# Patient Record
Sex: MALE | HISPANIC OR LATINO | Employment: FULL TIME | URBAN - METROPOLITAN AREA
[De-identification: names, ages, dates, MRNs, and addresses within clinical notes are randomized per-mention and may not be internally consistent; named-entity substitution may affect disease eponyms.]

---

## 2018-03-12 ENCOUNTER — HOSPITAL ENCOUNTER (EMERGENCY)
Facility: CLINIC | Age: 53
Discharge: HOME | End: 2018-03-12
Attending: NURSE PRACTITIONER | Admitting: NURSE PRACTITIONER
Payer: COMMERCIAL

## 2018-03-12 VITALS
BODY MASS INDEX: 26.58 KG/M2 | WEIGHT: 150 LBS | HEIGHT: 63 IN | TEMPERATURE: 98 F | DIASTOLIC BLOOD PRESSURE: 94 MMHG | SYSTOLIC BLOOD PRESSURE: 169 MMHG | RESPIRATION RATE: 18 BRPM | OXYGEN SATURATION: 97 %

## 2018-03-12 DIAGNOSIS — B02.8 HERPES ZOSTER WITH COMPLICATION: ICD-10-CM

## 2018-03-12 PROCEDURE — 25000125 ZZHC RX 250: Performed by: NURSE PRACTITIONER

## 2018-03-12 PROCEDURE — 99283 EMERGENCY DEPT VISIT LOW MDM: CPT

## 2018-03-12 RX ORDER — LANCETS
EACH MISCELLANEOUS
COMMUNITY
Start: 2017-06-19

## 2018-03-12 RX ORDER — GABAPENTIN 400 MG/1
800 CAPSULE ORAL
COMMUNITY
Start: 2017-06-19

## 2018-03-12 RX ORDER — PEN NEEDLE, DIABETIC 30 GX5/16"
NEEDLE, DISPOSABLE MISCELLANEOUS
COMMUNITY
Start: 2017-06-19

## 2018-03-12 RX ORDER — GLIMEPIRIDE 4 MG/1
TABLET ORAL
COMMUNITY
Start: 2009-04-21

## 2018-03-12 RX ORDER — SERTRALINE HYDROCHLORIDE 100 MG/1
100 TABLET, FILM COATED ORAL
COMMUNITY
Start: 2017-06-19

## 2018-03-12 RX ORDER — LISINOPRIL 20 MG/1
20 TABLET ORAL
COMMUNITY
Start: 2017-06-19

## 2018-03-12 RX ORDER — ACETAMINOPHEN 500 MG
500 TABLET ORAL
COMMUNITY
Start: 2017-06-19

## 2018-03-12 RX ORDER — TRAMADOL HYDROCHLORIDE 50 MG/1
TABLET ORAL
COMMUNITY
Start: 2009-04-13

## 2018-03-12 RX ORDER — HYDROCODONE BITARTRATE AND ACETAMINOPHEN 5; 325 MG/1; MG/1
1-2 TABLET ORAL EVERY 4 HOURS PRN
Qty: 15 TABLET | Refills: 0 | Status: SHIPPED | OUTPATIENT
Start: 2018-03-12

## 2018-03-12 RX ORDER — PROPARACAINE HYDROCHLORIDE 5 MG/ML
1 SOLUTION/ DROPS OPHTHALMIC ONCE
Status: COMPLETED | OUTPATIENT
Start: 2018-03-12 | End: 2018-03-12

## 2018-03-12 RX ORDER — ASPIRIN 81 MG/1
TABLET ORAL
COMMUNITY
Start: 2008-05-21

## 2018-03-12 RX ORDER — OMEGA-3 FATTY ACIDS/FISH OIL 300-1000MG
2000 CAPSULE ORAL
COMMUNITY
Start: 2017-06-19

## 2018-03-12 RX ORDER — VALACYCLOVIR HYDROCHLORIDE 1 G/1
1000 TABLET, FILM COATED ORAL 3 TIMES DAILY
Qty: 21 TABLET | Refills: 0 | Status: SHIPPED | OUTPATIENT
Start: 2018-03-12

## 2018-03-12 RX ORDER — ISOPROPYL ALCOHOL 0.7 ML/1
SWAB TOPICAL
COMMUNITY
Start: 2017-06-19

## 2018-03-12 RX ORDER — ATORVASTATIN CALCIUM 40 MG/1
40 TABLET, FILM COATED ORAL
COMMUNITY
Start: 2017-06-19

## 2018-03-12 RX ADMIN — PROPARACAINE HYDROCHLORIDE 1 DROP: 5 SOLUTION/ DROPS OPHTHALMIC at 17:22

## 2018-03-12 ASSESSMENT — ENCOUNTER SYMPTOMS
EYE PAIN: 1
HEADACHES: 1
FEVER: 1

## 2018-03-12 NOTE — ED AVS SNAPSHOT
"  Emergency Department    [address] [facility] [address]    Phone:  [phone]    Fax:  [phone]                                       Lior Thompson   MRN: [id number]    Department:   Emergency Department   Date of Visit:  3/12/2018           Patient Information     Date Of Birth          1965        Your diagnoses for this visit were:     Herpes zoster with complication        You were seen by Bren Byers, CNP.      Follow-up Information     Follow up with [facility] In 1 day.    Contact information:    [address]  [address]  [facility] [address]  [phone]        Follow up with [facility] In 2 days.    Contact information:    [facility]    [address] [place] [address] [phone]         Follow up with  Emergency Department.    Specialty:  EMERGENCY MEDICINE    Why:  As needed, If symptoms worsen    Contact information:    [address] [facility] [address] [phone]        Discharge Instructions         La Infección Zoster [Herpes Zoster: Shingles]  El zoster (también llamado \"Zoster de Herpes\") es melissa infección muy contagiosa causada por el mismo virus que causa la Varicela. Por lo general ocurre en personas adultas mayores de 50 años o en los que tienen la inmunidad baja.  La infección comienza con hormigueo en melissa parte pequeña de la piel a un lado del cuerpo. Dm los primeros días aparecen pequeñas ampollas dolorosas en esta área. Sin embargo, a la diferencia de varicela, las ronchas no se pasan al teetee del cuerpo. El líquido dentro de las ampollas es contagioso para cualquier persona que nunca haya tenido varicela. El periodo contagioso termina cuando todas las ampollas ingrid melissa costra o cascara (en general se khalida 2 semanas después del comienzo de la enfermedad).  Pueden aparecer cicatrices en las partes en donde aparecieron las ampollas. A veces la parte de la piel todavía se siente sensible " (molesta) y con dolor por meses después de que se pasa la infección.  Cuidado En Casa:  1) Puede usar acetaminofén (Tylenol) o ibuprofeno (Motrin, Advil) para controlar el dolor, a menos que le receten otro medicamento. [ NOTA : Si sufre de enfermedad del hígado o rinones, o alguna vez tuvo melissa úlcera estomacal o sangrado gastrointestinal, hable con gaytan médico antes de usar estos medicamentos.]  2) Lacy melissa solución de agua fresca (no fría) con maicena, soda de bicarbonato, de brett (Aveeno) o de polvo [place] (el cual se compra sin receta médica). Ponga la solución a la parte afectada donna melissa compresa. [place] va a secar y aliviar la piel.  3) Lávese la piel con agua y jabón para mantener la erupción (salpullido) byron de infección.  4) Quédase en la casa hasta que las ampollas se hayan formado costra o cascaras y usted ya no soto contagioso.  5) Goessel la medicina recetada según las instrucciones hasta acabarla.  Seguimiento  con gaytan doctor o según las instrucciones de nuestro personal si los adriel ya mencionados no le dan alivio.  Busque Prontamente Atención Médica  si algo de lo siguiente ocurre:    Señales de infección en la piel: le sale líquido colorado de las llagas, se le aumenta el enrojecimiento o el dolor    Demasiado vómito, dolor de adina aaliyah o confusión    Tos y fiebre con dificultad al respirar o respiraciones rápidas    Dolor, hinchazón (inflamación) o enrojecimiento en las coyunturas    Llagas que se pasan hasta cerca de los párpados    Fiebre de 100.4 F (38 C) o más megan, o donna le haya indicado gaytan proveedor de atención médica  Date Last Reviewed: 9/25/2015 2000-2017 [employer]. [address] [place] [address]. Todos los derechos reservados. Esta información no pretende sustituir la atención médica profesional. Sólo gaytan médico puede diagnosticar y tratar un problema de jodi.          Discharge References/Attachments     HERPES EYE DISEASE, TREATMENT FOR (Citizen of [place])       24 Hour Appointment Hotline       To make an appointment at any [facility], call [phone] ([phone]). If you don't have a family doctor or clinic, we will help you find one. [facility] clinics are conveniently located to serve the needs of you and your family.             Review of your medicines      START taking        Dose / Directions Last dose taken    HYDROcodone-acetaminophen 5-325 MG per tablet   Commonly known as:  NORCO   Dose:  1-2 tablet   Quantity:  15 tablet        Take 1-2 tablets by mouth every 4 hours as needed for moderate to severe pain   Refills:  0        valACYclovir 1000 mg tablet   Commonly known as:  VALTREX   Dose:  1000 mg   Quantity:  21 tablet        Take 1 tablet (1,000 mg) by mouth 3 times daily   Refills:  0          Our records show that you are taking the medicines listed below. If these are incorrect, please call your family doctor or clinic.        Dose / Directions Last dose taken    acetaminophen 500 MG tablet   Commonly known as:  TYLENOL   Dose:  500 mg        Take 500 mg by mouth   Refills:  0        aspirin EC 81 MG EC tablet        Refills:  0        atorvastatin 40 MG tablet   Commonly known as:  LIPITOR   Dose:  40 mg        Take 40 mg by mouth   Refills:  0        NADIA CONTOUR NEXT test strip   Generic drug:  blood glucose monitoring        Test 2 times per day ** Use 2 veces al caprice   Refills:  0        cholecalciferol 1000 UNIT tablet   Commonly known as:  vitamin D3   Dose:  1000 Units        Take 1,000 Units by mouth   Refills:  0        CONTOUR NEXT EZ MONITOR W/DEVICE Kit        Use as directed ** Use uziel johnston .   Refills:  0        gabapentin 400 MG capsule   Commonly known as:  NEURONTIN   Dose:  800 mg        Take 800 mg by mouth   Refills:  0        glimepiride 4 MG tablet   Commonly known as:  AMARYL        Refills:  0        insulin aspart prot & aspart injection   Commonly known as:  NovoLOG MIX 70/30 PEN   Dose:  35 Units         "Inject 35 Units Subcutaneous   Refills:  0        lisinopril 20 MG tablet   Commonly known as:  PRINIVIL/ZESTRIL   Dose:  20 mg        Take 20 mg by mouth   Refills:  0        metFORMIN 1000 MG tablet   Commonly known as:  GLUCOPHAGE   Dose:  1000 mg        Take 1,000 mg by mouth   Refills:  0        omega 3 1000 MG Caps   Dose:  2000 mg        Take 2,000 mg by mouth   Refills:  0        pen needles 5/16\" 31G X 8 MM Misc        Use as directed twice daily** USE MARIBELL LO INDICADO DOS VECES AL SANTANA   Refills:  0        sertraline 100 MG tablet   Commonly known as:  ZOLOFT   Dose:  100 mg        Take 100 mg by mouth   Refills:  0        SM ALCOHOL PREP 70 % Pads        USE AS DIRECTED ** USE MARIBELL LO INDICADO   Refills:  0        traMADol 50 MG tablet   Commonly known as:  ULTRAM        Refills:  0        UNILET COMFORTOUCH LANCET Misc        Test 2 times per day ** Use 2 veces al santana   Refills:  0                Prescriptions were sent or printed at these locations (2 Prescriptions)                   Other Prescriptions                Printed at Department/Unit printer (2 of 2)         valACYclovir (VALTREX) 1000 mg tablet               HYDROcodone-acetaminophen (NORCO) 5-325 MG per tablet                Orders Needing Specimen Collection     None      Pending Results     No orders found from 3/10/2018 to 3/13/2018.            Pending Culture Results     No orders found from 3/10/2018 to 3/13/2018.            Pending Results Instructions     If you had any lab results that were not finalized at the time of your Discharge, you can call the ED Lab Result RN at [phone]. You will be contacted by this team for any positive Lab results or changes in treatment. The nurses are available 7 days a week from 10A to 6:30P.  You can leave a message 24 hours per day and they will return your call.        Test Results From Your Hospital Stay               Clinical Quality Measure: Blood Pressure Screening     Your blood pressure " "was checked while you were in the emergency department today. The last reading we obtained was  BP: (!) 169/94 . Please read the guidelines below about what these numbers mean and what you should do about them.  If your systolic blood pressure (the top number) is less than 120 and your diastolic blood pressure (the bottom number) is less than 80, then your blood pressure is normal. There is nothing more that you need to do about it.  If your systolic blood pressure (the top number) is 120-139 or your diastolic blood pressure (the bottom number) is 80-89, your blood pressure may be higher than it should be. You should have your blood pressure rechecked within a year by a primary care provider.  If your systolic blood pressure (the top number) is 140 or greater or your diastolic blood pressure (the bottom number) is 90 or greater, you may have high blood pressure. High blood pressure is treatable, but if left untreated over time it can put you at risk for heart attack, stroke, or kidney failure. You should have your blood pressure rechecked by a primary care provider within the next 4 weeks.  If your provider in the emergency department today gave you specific instructions to follow-up with your doctor or provider even sooner than that, you should follow that instruction and not wait for up to 4 weeks for your follow-up visit.        Thank you for choosing [facility]       Thank you for choosing [facility] for your care. Our goal is always to provide you with excellent care. Hearing back from our patients is one way we can continue to improve our services. Please take a few minutes to complete the written survey that you may receive in the mail after you visit with us. Thank you!        [facility] [employer] Information     [employer] lets you send messages to your doctor, view your test results, renew your prescriptions, schedule appointments and more. To sign up, go to [email or web address] . Click on \"Log in\" on the left side of the " "screen, which will take you to the Welcome page. Then click on \"Sign up Now\" on the right side of the page.     You will be asked to enter the access code listed below, as well as some personal information. Please follow the directions to create your username and password.     Your access code is: [email or web address]  Expires: 6/10/2018  7:00 PM     Your access code will  in 90 days. If you need help or a new code, please call your [facility] clinic or [phone].        Care EveryWhere ID     This is your Care EveryWhere ID. This could be used by other organizations to access your [facility] medical records  [id number]        Equal Access to Services     IRAIDA WRIGHT : Penny Blunt, jazz العلي, suzanne richards, joselito jiang. So [facility] [phone].    ATENCIÓN: Si habla español, tiene a gaytan disposición servicios gratuitos de asistencia lingüística. Llame al [phone].    We comply with applicable federal civil rights laws and [place] laws. We do not discriminate on the basis of race, color, national origin, age, disability, sex, sexual orientation, or gender identity.            After Visit Summary       This is your record. Keep this with you and show to your community pharmacist(s) and doctor(s) at your next visit.                  "

## 2018-03-12 NOTE — ED AVS SNAPSHOT
Emergency Department    [address] [facility] [address]    Phone:  [phone]    Fax:  [phone]                                       Lior Thompson   MRN: [id number]    Department:   Emergency Department   Date of Visit:  3/12/2018           After Visit Summary Signature Page     I have received my discharge instructions, and my questions have been answered. I have discussed any challenges I see with this plan with the nurse or doctor.    ..........................................................................................................................................  Patient/Patient Representative Signature      ..........................................................................................................................................  Patient Representative Print Name and Relationship to Patient    ..................................................               ................................................  Date                                            Time    ..........................................................................................................................................  Reviewed by Signature/Title    ...................................................              ..............................................  Date                                                            Time

## 2018-03-12 NOTE — ED PROVIDER NOTES
"  History     Chief Complaint:  Eye Pain     The history is limited by a language barrier. A  was used (Azeri).      Lior Thompson is a 52 year old male who presents with eye pain and facial rash. About 5 days, the patient developed a rash along the right cheek, left eyelid, nose, and upper lip. The patient went to the clinic today and was suspected to have shingles. He was told to come to ED for ophthalmologist. Initially the rash started as vesicles, but now present as scabs. The rash is itchy and painful, and sometimes feels numb. The patient also has had eye pain for the past 2 days after developing a vesicle on the lower lid, but doesn't have any visual disturbances. The patient denies fever and had an intermittent headache. He denies ear pain or ear rash. There are no rashes on any other part of his body. He has taken nothing for his symptoms. He wears glasses for driving but no contacts. The only medication he's taken is for his diabetes. He hasn't traveled recently.    Allergies:  NKDA     Medications:    Metformin  Novolog  Lisinopril  Amaryl  Asprin  Sertraline  Gabapentin     Past Medical History:    DM II    Past Surgical History:    The patient does not have any pertinent past surgical history  Family History:    No past pertinent family history.     Social History:  The patient denies tobacco or alcohol use.  Marital Status:   [2]     Review of Systems   Constitutional: Positive for fever.   Eyes: Positive for pain. Negative for visual disturbance.   Skin: Positive for rash.   Neurological: Positive for headaches.   Psychiatric/Behavioral: Negative for suicidal ideas.   All other systems reviewed and are negative.    Physical Exam   First Vitals:  BP: (!) 169/94  Heart Rate: 79  Temp: 98  F (36.7  C)  Resp: 16  Height: 160 cm (5' 3\")  Weight: 68 kg (150 lb)  SpO2: 97 %  Visual Acuity-Left:  (10 20)  Visual Acuity-Right:  (10 25)    Physical Exam  Nursing notes " reviewed. Vitals reviewed.  General: Alert. Well kept.  Ears: TM normal. No rash in ear canal.  Eyes:  Left eye conjunctiva non-injected, non-icteric. EOMI  Visual acuity: right eye - 20/25; left eye - 20/20.  Right eye: mild conjunctival injection on inferior aspect not extending to the limbus - no hemorrhage, or lacerations. No scleral icterus. No foreign body noted on upper eyelid eversion. On slit lamp exam - anterior chamber clear, no hyphema. no foreign body noted in cornea or conjunctiva. No corneal laceration or abrasion.  Normal Jacob sign. No corneal ulcer. No dendrites. No fluorescence uptake.  Proptosis-absent  Ptosis-absent  Anisocoria- absent  IOP 13  Neck/Throat: Moist mucous membranes. Normal voice.  Cardiac: Regular rhythm. Normal heart sounds.  Pulmonary: Clear and equal breath sounds bilaterally.   Musculoskeletal: Normal gross range of motion of all 4 extremities.   Neurological: Alert and oriented x4.  CNII-XII intact.   Skin: Warm and dry. Normal appearance of visualized exposed skin without rashes or petechiae.  Psych: Affect normal. Good eye contact.    Emergency Department Course     Interventions:  1722 Proparacaine 0.5% 1 drop, right eye    Emergency Department Course:  Nursing notes and vitals reviewed.     1657 I performed an exam of the patient as documented above.     Procedure performed as noted above.    1740 I rechecked the patient and discussed the results of his workup thus far.     1800 Page placed to [facility]    1824 Page placed to [facility]    1845 I consulted with Dr. Yang, [facility], regarding the patient's history and presentation here in the emergency department.    1850 I rechecked the patient and discussed the results of his workup thus far.     Findings and plan explained to the patient. Patient discharged home with instructions regarding supportive care, medications, and reasons to return. The importance of close follow-up was reviewed. The  patient was prescribed Valtrex and Norco.    I personally reviewed the laboratory results with the patient and answered all related questions prior to discharge.     Impression & Plan      Medical Decision Making:  Lior Thompson is a 52 year old male who presents for evaluation of painful rash on face.  The rash is in a dermatomal distribution over a single dermatome and appears clinically consistent with herpes zoster, concerning as in the V1 distribution of CN V.  Although the rash is scabbed with no new vesicles, will start valacyclovir for this due to V1 distribution and start pain medicine as noted below.  No signs at this point of disseminated zoster.  Patient is not immunocompromised and I see no signs of systemic illness that might have lead to this.  I will not get lab work to look for things like HIV or leukemia therefore.  See primary care doctor in follow up for recheck and refill of pain medicine if needed.   Needs urgent opthalmology referral as in V1 distribution; I do not see eye involvement at this time.  I spoke with Dr. Yang, [facility], who will see patient in clinic tomorrow.  He will return with fevers, vision loss, ear pain or ear rash, worsening  Headache or any new symptoms.     Diagnosis:    ICD-10-CM   1. Herpes zoster with complication B02.8     Disposition:  Discharged to home.    Discharge Medications:   Details   valacyclovir (VALTREX) 1000 mg tablet Take 1 tablet (1,000 mg) by mouth 3 times daily, Disp-21 tablet, R-0, Local Print      Hydrocodone-acetaminophen (NORCO) 5-325 MG per tablet Take 1-2 tablets by mouth every 4 hours as needed for moderate to severe pain, Disp-15 tablet, R-0, Local Print     I, Simon De La Garza, am serving as a scribe on 3/12/2018 at 4:57 PM to personally document services performed by Bren Byers CNP based on my observations and the provider's statements to me.     Simon De La Garza  3/12/2018    EMERGENCY DEPARTMENT       Bren Byers  CNP  03/12/18 [id number]

## 2018-03-12 NOTE — LETTER
March 12, 2018      To Whom It May Concern:      Lior Thompson was seen in our Emergency Department today, 03/12/18. Please excuse Lior from work on 3- due to illness.  He may return to work/school when improved.    Sincerely,        Bren Byers, CNP

## 2018-03-12 NOTE — DISCHARGE INSTRUCTIONS
"  La Infección Zoster [Herpes Zoster: Shingles]  El zoster (también llamado \"Zoster de Herpes\") es melissa infección muy contagiosa causada por el mismo virus que causa la Varicela. Por lo general ocurre en personas adultas mayores de 50 años o en los que tienen la inmunidad baja.  La infección comienza con hormigueo en melissa parte pequeña de la piel a un lado del cuerpo. Dm los primeros días aparecen pequeñas ampollas dolorosas en esta área. Sin embargo, a la diferencia de varicela, las ronchas no se pasan al teetee del cuerpo. El líquido dentro de las ampollas es contagioso para cualquier persona que nunca haya tenido varicela. El periodo contagioso termina cuando todas las ampollas ingrid melissa costra o cascara (en general se khalida 2 semanas después del comienzo de la enfermedad).  Pueden aparecer cicatrices en las partes en donde aparecieron las ampollas. A veces la parte de la piel todavía se siente sensible (molesta) y con dolor por meses después de que se pasa la infección.  Cuidado En Casa:  1) Puede usar acetaminofén (Tylenol) o ibuprofeno (Motrin, Advil) para controlar el dolor, a menos que le receten otro medicamento. [ NOTA : Si sufre de enfermedad del hígado o rinones, o alguna vez tuvo melissa úlcera estomacal o sangrado gastrointestinal, hable con gaytan médico antes de usar estos medicamentos.]  2) Lacy melissa solución de agua fresca (no fría) con maicena, soda de bicarbonato, de brett (Aveeno) o de polvo [place] (el cual se compra sin receta médica). Ponga la solución a la parte afectada donna melissa compresa. [place] va a secar y aliviar la piel.  3) Lávese la piel con agua y jabón para mantener la erupción (salpullido) byron de infección.  4) Quédase en la casa hasta que las ampollas se hayan formado costra o cascaras y usted ya no soto contagioso.  5) Sargeant la medicina recetada según las instrucciones hasta acabarla.  Seguimiento  con gaytan doctor o según las instrucciones de nuestro personal si los adriel ya " mencionados no le dan alivio.  Busque Prontamente Atención Médica  si algo de lo siguiente ocurre:    Señales de infección en la piel: le sale líquido colorado de las llagas, se le aumenta el enrojecimiento o el dolor    Demasiado vómito, dolor de adina aaliyah o confusión    Tos y fiebre con dificultad al respirar o respiraciones rápidas    Dolor, hinchazón (inflamación) o enrojecimiento en las coyunturas    Llagas que se pasan hasta cerca de los párpados    Fiebre de 100.4 F (38 C) o más megan, o donna le haya indicado gaytan proveedor de atención médica  Date Last Reviewed: 9/25/2015 2000-2017 [employer]. [address] [place] [address]. Todos los derechos reservados. Esta información no pretende sustituir la atención médica profesional. Sólo gaytan médico puede diagnosticar y tratar un problema de jodi.

## 2019-12-09 ENCOUNTER — APPOINTMENT (OUTPATIENT)
Dept: GENERAL RADIOLOGY | Facility: CLINIC | Age: 54
End: 2019-12-09
Attending: EMERGENCY MEDICINE
Payer: COMMERCIAL

## 2019-12-09 ENCOUNTER — HOSPITAL ENCOUNTER (EMERGENCY)
Facility: CLINIC | Age: 54
Discharge: HOME | End: 2019-12-09
Attending: EMERGENCY MEDICINE | Admitting: EMERGENCY MEDICINE
Payer: COMMERCIAL

## 2019-12-09 ENCOUNTER — APPOINTMENT (OUTPATIENT)
Dept: ULTRASOUND IMAGING | Facility: CLINIC | Age: 54
End: 2019-12-09
Attending: EMERGENCY MEDICINE
Payer: COMMERCIAL

## 2019-12-09 VITALS
SYSTOLIC BLOOD PRESSURE: 148 MMHG | HEART RATE: 74 BPM | OXYGEN SATURATION: 97 % | TEMPERATURE: 99.5 F | BODY MASS INDEX: 26.57 KG/M2 | WEIGHT: 150 LBS | RESPIRATION RATE: 16 BRPM | DIASTOLIC BLOOD PRESSURE: 76 MMHG

## 2019-12-09 DIAGNOSIS — R07.9 RIGHT-SIDED CHEST PAIN: ICD-10-CM

## 2019-12-09 DIAGNOSIS — R74.8 ELEVATED LIPASE: ICD-10-CM

## 2019-12-09 DIAGNOSIS — R73.9 HYPERGLYCEMIA: ICD-10-CM

## 2019-12-09 LAB
ALBUMIN SERPL-MCNC: 3.5 G/DL (ref 3.4–5)
ALP SERPL-CCNC: 105 U/L (ref 40–150)
ALT SERPL W P-5'-P-CCNC: 17 U/L (ref 0–70)
ANION GAP SERPL CALCULATED.3IONS-SCNC: 6 MMOL/L (ref 3–14)
AST SERPL W P-5'-P-CCNC: 10 U/L (ref 0–45)
BASOPHILS # BLD AUTO: 0.1 10E9/L (ref 0–0.2)
BASOPHILS NFR BLD AUTO: 0.6 %
BILIRUB SERPL-MCNC: 0.1 MG/DL (ref 0.2–1.3)
BUN SERPL-MCNC: 19 MG/DL (ref 7–30)
CALCIUM SERPL-MCNC: 8.3 MG/DL (ref 8.5–10.1)
CHLORIDE SERPL-SCNC: 107 MMOL/L (ref 94–109)
CO2 SERPL-SCNC: 25 MMOL/L (ref 20–32)
CREAT SERPL-MCNC: 0.76 MG/DL (ref 0.66–1.25)
D DIMER PPP FEU-MCNC: <0.3 UG/ML FEU (ref 0–0.5)
DIFFERENTIAL METHOD BLD: ABNORMAL
EOSINOPHIL # BLD AUTO: 0.4 10E9/L (ref 0–0.7)
EOSINOPHIL NFR BLD AUTO: 3.7 %
ERYTHROCYTE [DISTWIDTH] IN BLOOD BY AUTOMATED COUNT: 13.5 % (ref 10–15)
GFR SERPL CREATININE-BSD FRML MDRD: >90 ML/MIN/{1.73_M2}
GLUCOSE SERPL-MCNC: 258 MG/DL (ref 70–99)
HCT VFR BLD AUTO: 42.9 % (ref 40–53)
HGB BLD-MCNC: 14.7 G/DL (ref 13.3–17.7)
IMM GRANULOCYTES # BLD: 0 10E9/L (ref 0–0.4)
IMM GRANULOCYTES NFR BLD: 0.3 %
LIPASE SERPL-CCNC: 859 U/L (ref 73–393)
LYMPHOCYTES # BLD AUTO: 4 10E9/L (ref 0.8–5.3)
LYMPHOCYTES NFR BLD AUTO: 34.1 %
MCH RBC QN AUTO: 30.7 PG (ref 26.5–33)
MCHC RBC AUTO-ENTMCNC: 34.3 G/DL (ref 31.5–36.5)
MCV RBC AUTO: 90 FL (ref 78–100)
MONOCYTES # BLD AUTO: 1 10E9/L (ref 0–1.3)
MONOCYTES NFR BLD AUTO: 8.9 %
NEUTROPHILS # BLD AUTO: 6.1 10E9/L (ref 1.6–8.3)
NEUTROPHILS NFR BLD AUTO: 52.4 %
NRBC # BLD AUTO: 0 10*3/UL
NRBC BLD AUTO-RTO: 0 /100
PLATELET # BLD AUTO: 229 10E9/L (ref 150–450)
POTASSIUM SERPL-SCNC: 4 MMOL/L (ref 3.4–5.3)
PROT SERPL-MCNC: 6.6 G/DL (ref 6.8–8.8)
RBC # BLD AUTO: 4.79 10E12/L (ref 4.4–5.9)
SODIUM SERPL-SCNC: 138 MMOL/L (ref 133–144)
TROPONIN I SERPL-MCNC: <0.015 UG/L (ref 0–0.04)
WBC # BLD AUTO: 11.7 10E9/L (ref 4–11)

## 2019-12-09 PROCEDURE — 99285 EMERGENCY DEPT VISIT HI MDM: CPT | Mod: 25

## 2019-12-09 PROCEDURE — 83690 ASSAY OF LIPASE: CPT | Performed by: EMERGENCY MEDICINE

## 2019-12-09 PROCEDURE — 96374 THER/PROPH/DIAG INJ IV PUSH: CPT

## 2019-12-09 PROCEDURE — 84484 ASSAY OF TROPONIN QUANT: CPT | Performed by: EMERGENCY MEDICINE

## 2019-12-09 PROCEDURE — 80053 COMPREHEN METABOLIC PANEL: CPT | Performed by: EMERGENCY MEDICINE

## 2019-12-09 PROCEDURE — 76705 ECHO EXAM OF ABDOMEN: CPT

## 2019-12-09 PROCEDURE — 71046 X-RAY EXAM CHEST 2 VIEWS: CPT

## 2019-12-09 PROCEDURE — 25000128 H RX IP 250 OP 636: Performed by: EMERGENCY MEDICINE

## 2019-12-09 PROCEDURE — 85025 COMPLETE CBC W/AUTO DIFF WBC: CPT | Performed by: EMERGENCY MEDICINE

## 2019-12-09 PROCEDURE — 85379 FIBRIN DEGRADATION QUANT: CPT | Performed by: EMERGENCY MEDICINE

## 2019-12-09 PROCEDURE — 93005 ELECTROCARDIOGRAM TRACING: CPT

## 2019-12-09 RX ORDER — KETOROLAC TROMETHAMINE 15 MG/ML
15 INJECTION, SOLUTION INTRAMUSCULAR; INTRAVENOUS ONCE
Status: COMPLETED | OUTPATIENT
Start: 2019-12-09 | End: 2019-12-09

## 2019-12-09 RX ADMIN — KETOROLAC TROMETHAMINE 15 MG: 15 INJECTION, SOLUTION INTRAMUSCULAR; INTRAVENOUS at 01:28

## 2019-12-09 NOTE — ED NOTES
Pt reports taking 3s243hk pills of ibuprofen 1hr ago.  Reported to MD, will continue to monitor.  
Additional Progress Note...

## 2019-12-09 NOTE — ED AVS SNAPSHOT
Emergency Department  [address] [facility] [address]  Phone:  [phone]  Fax:  [phone]                                    Lior Thompson   MRN: [id number]    Department:   Emergency Department   Date of Visit:  12/9/2019           After Visit Summary Signature Page    I have received my discharge instructions, and my questions have been answered. I have discussed any challenges I see with this plan with the nurse or doctor.    ..........................................................................................................................................  Patient/Patient Representative Signature      ..........................................................................................................................................  Patient Representative Print Name and Relationship to Patient    ..................................................               ................................................  Date                                   Time    ..........................................................................................................................................  Reviewed by Signature/Title    ...................................................              ..............................................  Date                                               Time          22EPIC Rev 08/18

## 2019-12-09 NOTE — ED PROVIDER NOTES
History     Chief Complaint:  Chest Pain    HPI   Lior Thompson is a 54 year old male, with a history of type 2 diabetes, hyperlipidemia, and hypertension, who presents to the ED for evaluation of right sided chest pain. The patient reports he developed intermittent sharp brief chest pain 3 weeks ago. No trauma. The pain worsens with deep breaths and radiates to his back on occasion. The pain inconsistently worsens with eating, and is unrelated to position or activity and exertion he is here because he is tired of dealing with the symptoms and after 3 weeks decided it was time to get checked out.  No history of cardiac disease.  No cough or fevers.. The patient notes he does not smoke or drink. The patient denies any other symptoms/complaints.  He is not short of breath.  No leg swelling.  No history of DVT or PE.    Allergies:  Insulin glargine: abdominal pain       Medications:    Aspirin 81mg  Vitamin D3  Gabapentin  Glimepiride  Novolog  Lisinopril   Metformin  Atorvastatin   Fish oil  Zoloft  Trazodone     Past Medical History:    Type 2 DM  HTN  HLD  Diabetic retinopathy   Depression    Herpes zoster  Chronic pain disorder  Bilateral cataract   Neuropathy    Past Surgical History:    History reviewed. No pertinent surgical history.    Family History:    History reviewed. No pertinent family history.     Social History:  Smoking status: Never smoker    Substance use: No  Alcohol use: No  Works at a restaurant , Originally from [facility]  Marital Status:   [2]     Review of Systems   All other systems reviewed and are negative.    Physical Exam     Patient Vitals for the past 24 hrs:   BP Temp Temp src Pulse Heart Rate Resp SpO2 Weight   12/09/19 0223 -- -- -- -- 76 15 -- --   12/09/19 0222 (!) 142/73 -- -- 76 -- -- -- --   12/09/19 0147 -- -- -- -- 76 21 95 % --   12/09/19 0108 -- 99.5  F (37.5  C) Oral -- -- -- -- 68 kg (150 lb)   12/09/19 0051 (!) 153/73 -- -- 86 -- 16 98 % --      Physical Exam  General: Nontoxic-appearing male sitting upright in room 28  HENT: mucous membranes moist  CV: regular rate, regular rhythm, no lower extremity edema, no JVD, palpable symmetric radial pulses  Resp: clear throughout, normal effort, no crackles or wheezing  GI: abdomen soft and nontender, no guarding, negative Perdomo's sign  MSK: no bony tenderness to chest  Skin: appropriately warm and dry, no erythema or vesicles to chest wall  Neuro: alert, clear speech, oriented   Psych: normal mood and affect    Emergency Department Course     ECG (0:40:09):  Rate 84 bpm. MS interval 158. QRS duration 98. QT/QTc 350/413. P-R-T axes 24 -65 49. Normal sinus rhythm. Left axis deviation. Pulmonary disease pattern. Abnormal ECG. Interpreted at 0048 by Aman Rg MD.    Imaging:  Radiographic findings were communicated with the patient who voiced understanding of the findings.    XR Chest 2 Views  IMPRESSION: No acute abnormality. As read by Radiology.     US Abdomen Limited  IMPRESSION:  1.  No visible cholelithiasis or biliary dilatation. Sonographic Perdomo's sign negative.  2.  No right hydronephrosis.   As read by Radiology.     Laboratory:  Laboratory findings were communicated with the patient who voiced understanding of the findings.    Troponin I (0100): <0.015  D dimer: <0.3    Lipase: 859(H)    CBC: WBC 11.7(H), o/w WNL (HGB 14.7, )  CMP: Glucose 258(H), Calcium 8.3(L), Bilirubin total 0.1(L), Protein total 6.6(L), o/w WNL (Creatinine 0.76)     Interventions:  0128: Toradol 15mg IV    Emergency Department Course:  0040: EKG obtained in the ED, see results above.     Past medical records, nursing notes, and vitals reviewed.    0051: I performed an exam of the patient as documented above.     I performed electronic chart review in [facility].  The patient was placed on continuous cardiac and pulse ox monitoring.    0100: IV was inserted and blood was drawn for laboratory testing, results  above.    The patient was sent for a chest x-ray and limited abdomen ultrasound while in the emergency department, results above.     0227: I rechecked the patient and discussed the results of his workup thus far.     Findings and plan explained to the patient. Patient discharged home with instructions regarding supportive care, medications, and reasons to return. The importance of close follow-up was reviewed. The patient was prescribed Omeprazole.     I personally reviewed the laboratory results with the patient and answered all related questions prior to discharge.     Impression & Plan      Medical Decision Making:  Negative d-dimer makes pulmonary embolism very unlikely.  Acute coronary syndrome is also considered, though his symptoms are fairly atypical and despite multiple weeks of symptoms, he has a negative troponin and EKG without ischemic findings.  Do not think he requires hospitalization or urgent provocative testing or cardiology consultation.  No evidence of pneumothorax, pneumonia, or pulmonary edema.  Highly doubt pericarditis.  Upper abdominal conditions including biliary colic and hepatitis were also considered.  No evidence of shingles.  While his lipase is above the normal limit, and is not particularly high, being barely twice the normal limit, he has not had epigastric tenderness, vomiting, alcohol abuse, or evidence of cholelithiasis to suggest a clinical connection to his presenting symptoms, though this finding was reviewed with the patient.  I think he is appropriate for discharge home with close outpatient follow-up, having asked him to return for sudden worsening in any hour, and he agrees with this plan.  Reassurance of today's testing as well as its limitations were reviewed with the patient.    Diagnosis:    ICD-10-CM   1. Right-sided chest pain R07.9   2. Elevated lipase R74.8   3. Hyperglycemia R73.9     Disposition: Patient discharged to home     Discharge Medications:  New  Prescriptions    OMEPRAZOLE (PRILOSEC) 20 MG DR CAPSULE    Take 1 capsule (20 mg) by mouth daily      Flavia Zelaya  12/9/2019    EMERGENCY DEPARTMENT    Scribe Disclosure:  I, Flavia Zelaya, am serving as a scribe at 12:51 AM on 12/9/2019 to document services personally performed by Aman Rg MD based on my observations and the provider's statements to me.     This record was created at least in part using electronic voice recognition software, so please excuse any typographical errors.       Aman Rg MD  12/09/19 1279

## 2020-01-06 ENCOUNTER — APPOINTMENT (OUTPATIENT)
Dept: GENERAL RADIOLOGY | Facility: CLINIC | Age: 55
End: 2020-01-06
Attending: EMERGENCY MEDICINE
Payer: COMMERCIAL

## 2020-01-06 ENCOUNTER — APPOINTMENT (OUTPATIENT)
Dept: ULTRASOUND IMAGING | Facility: CLINIC | Age: 55
End: 2020-01-06
Attending: EMERGENCY MEDICINE
Payer: COMMERCIAL

## 2020-01-06 ENCOUNTER — HOSPITAL ENCOUNTER (EMERGENCY)
Facility: CLINIC | Age: 55
Discharge: HOME | End: 2020-01-06
Attending: EMERGENCY MEDICINE | Admitting: EMERGENCY MEDICINE
Payer: COMMERCIAL

## 2020-01-06 VITALS
TEMPERATURE: 97.7 F | RESPIRATION RATE: 18 BRPM | SYSTOLIC BLOOD PRESSURE: 128 MMHG | WEIGHT: 150 LBS | HEART RATE: 81 BPM | BODY MASS INDEX: 26.58 KG/M2 | HEIGHT: 63 IN | DIASTOLIC BLOOD PRESSURE: 74 MMHG | OXYGEN SATURATION: 96 %

## 2020-01-06 DIAGNOSIS — R10.13 EPIGASTRIC PAIN: ICD-10-CM

## 2020-01-06 LAB
ALBUMIN SERPL-MCNC: 3.3 G/DL (ref 3.4–5)
ALP SERPL-CCNC: 113 U/L (ref 40–150)
ALT SERPL W P-5'-P-CCNC: 24 U/L (ref 0–70)
ANION GAP SERPL CALCULATED.3IONS-SCNC: 6 MMOL/L (ref 3–14)
AST SERPL W P-5'-P-CCNC: 17 U/L (ref 0–45)
BASOPHILS # BLD AUTO: 0 10E9/L (ref 0–0.2)
BASOPHILS NFR BLD AUTO: 0.4 %
BILIRUB SERPL-MCNC: 0.2 MG/DL (ref 0.2–1.3)
BUN SERPL-MCNC: 24 MG/DL (ref 7–30)
CALCIUM SERPL-MCNC: 8.4 MG/DL (ref 8.5–10.1)
CHLORIDE SERPL-SCNC: 104 MMOL/L (ref 94–109)
CO2 SERPL-SCNC: 24 MMOL/L (ref 20–32)
CREAT SERPL-MCNC: 0.65 MG/DL (ref 0.66–1.25)
D DIMER PPP FEU-MCNC: <0.3 UG/ML FEU (ref 0–0.5)
DIFFERENTIAL METHOD BLD: NORMAL
EOSINOPHIL # BLD AUTO: 0.3 10E9/L (ref 0–0.7)
EOSINOPHIL NFR BLD AUTO: 3.8 %
ERYTHROCYTE [DISTWIDTH] IN BLOOD BY AUTOMATED COUNT: 13.3 % (ref 10–15)
GFR SERPL CREATININE-BSD FRML MDRD: >90 ML/MIN/{1.73_M2}
GLUCOSE SERPL-MCNC: 344 MG/DL (ref 70–99)
HCT VFR BLD AUTO: 42.1 % (ref 40–53)
HGB BLD-MCNC: 14.7 G/DL (ref 13.3–17.7)
IMM GRANULOCYTES # BLD: 0 10E9/L (ref 0–0.4)
IMM GRANULOCYTES NFR BLD: 0.3 %
INTERPRETATION ECG - MUSE: NORMAL
LIPASE SERPL-CCNC: 276 U/L (ref 73–393)
LYMPHOCYTES # BLD AUTO: 2.5 10E9/L (ref 0.8–5.3)
LYMPHOCYTES NFR BLD AUTO: 31 %
MCH RBC QN AUTO: 30.4 PG (ref 26.5–33)
MCHC RBC AUTO-ENTMCNC: 34.9 G/DL (ref 31.5–36.5)
MCV RBC AUTO: 87 FL (ref 78–100)
MONOCYTES # BLD AUTO: 0.9 10E9/L (ref 0–1.3)
MONOCYTES NFR BLD AUTO: 11.8 %
NEUTROPHILS # BLD AUTO: 4.2 10E9/L (ref 1.6–8.3)
NEUTROPHILS NFR BLD AUTO: 52.7 %
NRBC # BLD AUTO: 0 10*3/UL
NRBC BLD AUTO-RTO: 0 /100
PLATELET # BLD AUTO: 220 10E9/L (ref 150–450)
POTASSIUM SERPL-SCNC: 3.7 MMOL/L (ref 3.4–5.3)
PROT SERPL-MCNC: 6.5 G/DL (ref 6.8–8.8)
RBC # BLD AUTO: 4.83 10E12/L (ref 4.4–5.9)
SODIUM SERPL-SCNC: 134 MMOL/L (ref 133–144)
TROPONIN I SERPL-MCNC: <0.015 UG/L (ref 0–0.04)
WBC # BLD AUTO: 8 10E9/L (ref 4–11)

## 2020-01-06 PROCEDURE — 25000132 ZZH RX MED GY IP 250 OP 250 PS 637: Performed by: EMERGENCY MEDICINE

## 2020-01-06 PROCEDURE — 71046 X-RAY EXAM CHEST 2 VIEWS: CPT

## 2020-01-06 PROCEDURE — 25800030 ZZH RX IP 258 OP 636: Performed by: EMERGENCY MEDICINE

## 2020-01-06 PROCEDURE — 85379 FIBRIN DEGRADATION QUANT: CPT | Performed by: EMERGENCY MEDICINE

## 2020-01-06 PROCEDURE — 76705 ECHO EXAM OF ABDOMEN: CPT

## 2020-01-06 PROCEDURE — 99285 EMERGENCY DEPT VISIT HI MDM: CPT | Mod: 25

## 2020-01-06 PROCEDURE — 85025 COMPLETE CBC W/AUTO DIFF WBC: CPT | Performed by: EMERGENCY MEDICINE

## 2020-01-06 PROCEDURE — 84484 ASSAY OF TROPONIN QUANT: CPT | Performed by: EMERGENCY MEDICINE

## 2020-01-06 PROCEDURE — 25000125 ZZHC RX 250: Performed by: EMERGENCY MEDICINE

## 2020-01-06 PROCEDURE — 96361 HYDRATE IV INFUSION ADD-ON: CPT

## 2020-01-06 PROCEDURE — 80053 COMPREHEN METABOLIC PANEL: CPT | Performed by: EMERGENCY MEDICINE

## 2020-01-06 PROCEDURE — 96374 THER/PROPH/DIAG INJ IV PUSH: CPT

## 2020-01-06 PROCEDURE — 93005 ELECTROCARDIOGRAM TRACING: CPT

## 2020-01-06 PROCEDURE — 83690 ASSAY OF LIPASE: CPT | Performed by: EMERGENCY MEDICINE

## 2020-01-06 RX ORDER — ASPIRIN 81 MG/1
324 TABLET, CHEWABLE ORAL ONCE
Status: COMPLETED | OUTPATIENT
Start: 2020-01-06 | End: 2020-01-06

## 2020-01-06 RX ADMIN — FAMOTIDINE 20 MG: 10 INJECTION, SOLUTION INTRAVENOUS at 02:15

## 2020-01-06 RX ADMIN — LIDOCAINE HYDROCHLORIDE 30 ML: 20 SOLUTION ORAL; TOPICAL at 01:42

## 2020-01-06 RX ADMIN — ASPIRIN 81 MG 324 MG: 81 TABLET ORAL at 02:14

## 2020-01-06 RX ADMIN — SODIUM CHLORIDE 1000 ML: 9 INJECTION, SOLUTION INTRAVENOUS at 02:14

## 2020-01-06 ASSESSMENT — ENCOUNTER SYMPTOMS
NAUSEA: 0
NUMBNESS: 0
DIARRHEA: 0
VOMITING: 0
CONSTIPATION: 0
FEVER: 0
APPETITE CHANGE: 0

## 2020-01-06 ASSESSMENT — MIFFLIN-ST. JEOR: SCORE: 1420.53

## 2020-01-06 NOTE — ED PROVIDER NOTES
History     Chief Complaint:  Chest pain     The history is provided by the patient. A  was used (Nauruan).      Lior Thompson is a 54 year old male with a history of diabetes, hypertension, and hyperlipidemia who presents with chest pain. Lior developed pain in his epigastrium and right chest 1 month ago and was seen in this emergency room. He had work up including RUQ US, d-dimer, EKG, etc. significant primarily for mildly elevated lipase at 859. He was discharged on omeprazole which he has been taking and does feel provides some relief. However, 2 weeks ago, while visiting [place] he was seen again for worsening pain. It is unclear what his diagnosis was, but he presents with laboratory study results that are reassuring.     He returned from [place] 2 days ago and presents because he feels his pain is now in his chest bilaterally, around the breasts particularly. He endorses constant pain every day for the past 2 weeks. He describes the pain as 10/10 and burning in quality, exacerbated by deep inspiration. He denies appetite change, nausea, vomiting, diarrhea, constipation, or fever. Notably, he has an appointment with his primary care provider for this issue in 1 week.    Allergies:  Insulin Glargine     Medications:    Atorvastatin  Gabapentin   Glimepiride   Insulin aspart prot & aspart   Lisinopril   Metformin  Omeprazole   Sertraline  Tramadol     Past Medical History:    Diabetes  High cholesterol   Hypertension   Depression   Chronic pain disorder   Neuropathy     Past Surgical History:    Surgical history reviewed. No pertinent surgical history.    Family History:    Family history reviewed. No pertinent family history.      Social History:  The patient was accompanied to the ED by his wife.  Smoking Status: Never Smoker  Smokeless Tobacco: Never Used  Alcohol Use: No  Drug Use: No    Review of Systems   Constitutional: Negative for appetite change and fever.   Cardiovascular:  "Positive for chest pain.   Gastrointestinal: Positive for abdominal pain (epigastrium). Negative for constipation, diarrhea, nausea and vomiting.   Neurological: Negative for numbness.   All other systems reviewed and are negative.    Physical Exam     Patient Vitals for the past 24 hrs:   BP Temp Temp src Pulse Heart Rate Resp SpO2 Height Weight   01/06/20 0535 128/74 -- -- -- 76 -- 96 % -- --   01/06/20 0347 (!) 154/78 -- -- 81 72 18 -- -- --   01/06/20 0221 -- -- -- -- 70 9 98 % -- --   01/06/20 0200 (!) 140/77 -- -- 74 74 10 97 % -- --   01/06/20 0108 (!) 140/71 97.7  F (36.5  C) Oral -- 75 14 99 % 1.6 m (5' 3\") 68 kg (150 lb)        Physical Exam  General: Well-developed and well-nourished. Well appearing middle aged  man. Cooperative.  Head:  Atraumatic.  Eyes:  Conjunctivae, lids, and sclerae are normal.  ENT:    Normal nose. Moist mucous membranes.  Neck:  Supple. Normal range of motion.  CV:  Regular rate and rhythm. Normal heart sounds with no murmurs, rubs, or gallops detected.  Resp:  No respiratory distress. Clear to auscultation bilaterally without decreased breath sounds, wheezing, rales, or rhonchi.  GI:  Soft. Non-distended.  Tenderness palpation in the epigastrium and the right upper quadrant with a negative Perdomo sign.   MS:  Normal ROM.  Skin:  Warm. Non-diaphoretic. No pallor.  Neuro:  Awake. A&Ox3. Normal strength.  Psych: Normal mood and affect. Normal speech.  Vitals reviewed.    Emergency Department Course   EKG  Indication: chest pain   Time: 0112  Rate 74 bpm. WV interval 154. QRS duration 94. QT/QTc 374/415.   Normal sinus rhythm   Left axis deviation   Pulmonary disease pattern   Abnormal ECG   No acute ST changes.  No prior for comparison.    Imaging:  Radiology findings were communicated with the patient and family who voiced understanding of the findings.    XR Chest 2 Views  Final Result  IMPRESSION:   No acute abnormality.  Reading per radiology     Abdomen US, limited (RUQ " only)  Final Result  IMPRESSION:  1.  No gallstone or biliary dilatation. Mild fatty infiltration of the liver.  Reading per radiology     Laboratory:  Laboratory findings were communicated with the patient and family who voiced understanding of the findings.    CMP: glucose 344 (H), calcium 8.4 (L), albumin 3.3 (L), protein total 6.5 (L), o/w WNL (Creatinine: 0.65)     Lipase: 276    CBC:  WBC 8.0, HGB 14.7, , o/w WNL     Troponin(0137):  <0.015      D dimer quantitative: <0.3    Interventions:  0142 GI cocktail 30 mL oral   0214 0.9% sodium chloride BOLUS 1000 mL IV   0214  mg oral   0215 Pepcid 20 mg IV    Emergency Department Course:  Past medical records, nursing notes, and vitals reviewed.    0138 I performed an exam of the patient as documented above.    IV was inserted and blood was drawn for laboratory testing, results above.     The patient was sent for a chest xray and abdomen US while in the emergency department, results above.       0525 Patient rechecked and updated. The patient feels that his pain is improved and is comfortable for discharge.      Findings and plan explained to the patient and spouse. Patient discharged home with instructions regarding supportive care, medications, and reasons to return. The importance of close follow-up was reviewed.   Impression & Plan   Medical Decision Making:  Lior is a 54 year old man who presents with several concerns.  He remarks he has been having epigastric/inferior chest pain for about 1 month.  He was seen in this emergency department and also when he was in [place] and it seems both of these work ups were quite reassuring.  It is unclear what changed today though he does remark the pain seems to be more around his breasts and moving into his chest.  His exam is significant for epigastric and right upper quadrant tenderness.  I think this is likely the source of his pain rather than his chest though he did return from [place] 2 days ago so  pulmonary embolism is considered.  Fortunately, d-dimer is negative essentially ruling this out.  Chest x-ray is reassuring without acute ST changes or arrhythmias.  Troponin is undetectable and given pain that he tells me is constant every day all day, this essentially rules out ACS.  His laboratory tests are otherwise significant for hyperglycemia without DKA and he is a known diabetic.  Previously elevated lipase has normalized and he has no evidence of cholestasis or hepatitis.  Right upper quadrant ultrasound reveals no acute pathology to explain his pain.  He was treated with IV fluids, Pepcid, aspirin, and a GI cocktail and on repeat evaluation states he is feeling much improved.  At this point, his pain is most likely due to gastritis/edophagitis or possibly gastric/duodenal ulcer.  He has already been started on a PPI and I have recommended he continue this. He would likely benefit from endoscopy so I have recommended he call GI to arrange outpatient appointment.  He already has an appointment with his primary care provider which he agrees to keep and he agrees to return with severe or changing pain, black or bloody stools, or any other symptoms.  I provided him written instructions on dietary changes for gastritis/GERD and answered all the patient and his wife's questions.  They verbalized understanding and are amenable to discharge.    Of note, patient is Turkmen-speaking and all interactions were completed using Turkmen .    Discharge Diagnosis:    ICD-10-CM    1. Epigastric pain R10.13        Disposition:  The patient was discharged home.    Discharge Medications:  Discharge Medication List as of 1/6/2020  5:36 AM          Scribe Disclosure:  ERIKA Boy Portai, am serving as a scribe at 1:38 AM on 1/6/2020 to document services personally performed by Cecy Phelps MD based on my observations and the provider's statements to me.      1/6/2020   Cecy Phelps MD Dixson, Kylie S,  MD  01/10/20 9330

## 2020-01-06 NOTE — ED AVS SNAPSHOT
Emergency Department  [address] [facility] [address]  Phone:  [phone]  Fax:  [phone]                                    Lior Thompson   MRN: [id number]    Department:   Emergency Department   Date of Visit:  1/6/2020           After Visit Summary Signature Page    I have received my discharge instructions, and my questions have been answered. I have discussed any challenges I see with this plan with the nurse or doctor.    ..........................................................................................................................................  Patient/Patient Representative Signature      ..........................................................................................................................................  Patient Representative Print Name and Relationship to Patient    ..................................................               ................................................  Date                                   Time    ..........................................................................................................................................  Reviewed by Signature/Title    ...................................................              ..............................................  Date                                               Time          22EPIC Rev 08/18

## 2020-01-06 NOTE — DISCHARGE INSTRUCTIONS
Continue antacid and call GI. You can also try over the counter Maalox/Mylanta.   Return if you have severe pain, changing pain, black or bloody stools, or any other new or concerning symptoms.  Avoid NSAIDs, alcohol, cigarettes, greasy or spicy foods, caffeine, chocolate, or any other foods that you feel may worsen your pain.

## 2020-01-10 ASSESSMENT — ENCOUNTER SYMPTOMS: ABDOMINAL PAIN: 1

## 2023-08-07 VITALS
RESPIRATION RATE: 16 BRPM | HEART RATE: 77 BPM | TEMPERATURE: 98 F | HEIGHT: 63 IN | BODY MASS INDEX: 26.58 KG/M2 | SYSTOLIC BLOOD PRESSURE: 159 MMHG | OXYGEN SATURATION: 99 % | DIASTOLIC BLOOD PRESSURE: 88 MMHG | WEIGHT: 150 LBS

## 2023-08-07 LAB
ANION GAP SERPL CALCULATED.3IONS-SCNC: 13 MMOL/L (ref 7–15)
BASOPHILS # BLD AUTO: 0.1 10E3/UL (ref 0–0.2)
BASOPHILS NFR BLD AUTO: 1 %
BUN SERPL-MCNC: 26.4 MG/DL (ref 6–20)
CALCIUM SERPL-MCNC: 9.4 MG/DL (ref 8.6–10)
CHLORIDE SERPL-SCNC: 100 MMOL/L (ref 98–107)
CREAT SERPL-MCNC: 0.95 MG/DL (ref 0.67–1.17)
DEPRECATED HCO3 PLAS-SCNC: 25 MMOL/L (ref 22–29)
EOSINOPHIL # BLD AUTO: 0.3 10E3/UL (ref 0–0.7)
EOSINOPHIL NFR BLD AUTO: 3 %
ERYTHROCYTE [DISTWIDTH] IN BLOOD BY AUTOMATED COUNT: 12.8 % (ref 10–15)
GFR SERPL CREATININE-BSD FRML MDRD: >90 ML/MIN/1.73M2
GLUCOSE SERPL-MCNC: 281 MG/DL (ref 70–99)
HCT VFR BLD AUTO: 43.5 % (ref 40–53)
HGB BLD-MCNC: 15.3 G/DL (ref 13.3–17.7)
IMM GRANULOCYTES # BLD: 0 10E3/UL
IMM GRANULOCYTES NFR BLD: 0 %
LYMPHOCYTES # BLD AUTO: 3.9 10E3/UL (ref 0.8–5.3)
LYMPHOCYTES NFR BLD AUTO: 39 %
MCH RBC QN AUTO: 29.9 PG (ref 26.5–33)
MCHC RBC AUTO-ENTMCNC: 35.2 G/DL (ref 31.5–36.5)
MCV RBC AUTO: 85 FL (ref 78–100)
MONOCYTES # BLD AUTO: 0.9 10E3/UL (ref 0–1.3)
MONOCYTES NFR BLD AUTO: 9 %
NEUTROPHILS # BLD AUTO: 4.7 10E3/UL (ref 1.6–8.3)
NEUTROPHILS NFR BLD AUTO: 48 %
NRBC # BLD AUTO: 0 10E3/UL
NRBC BLD AUTO-RTO: 0 /100
PLATELET # BLD AUTO: 228 10E3/UL (ref 150–450)
POTASSIUM SERPL-SCNC: 4 MMOL/L (ref 3.4–5.3)
RBC # BLD AUTO: 5.12 10E6/UL (ref 4.4–5.9)
SODIUM SERPL-SCNC: 138 MMOL/L (ref 136–145)
TROPONIN T SERPL HS-MCNC: 11 NG/L
WBC # BLD AUTO: 9.9 10E3/UL (ref 4–11)

## 2023-08-07 PROCEDURE — 85025 COMPLETE CBC W/AUTO DIFF WBC: CPT | Performed by: EMERGENCY MEDICINE

## 2023-08-07 PROCEDURE — 84484 ASSAY OF TROPONIN QUANT: CPT | Performed by: STUDENT IN AN ORGANIZED HEALTH CARE EDUCATION/TRAINING PROGRAM

## 2023-08-07 PROCEDURE — 84484 ASSAY OF TROPONIN QUANT: CPT | Performed by: EMERGENCY MEDICINE

## 2023-08-07 PROCEDURE — 93005 ELECTROCARDIOGRAM TRACING: CPT

## 2023-08-07 PROCEDURE — 80048 BASIC METABOLIC PNL TOTAL CA: CPT | Performed by: EMERGENCY MEDICINE

## 2023-08-07 PROCEDURE — 99285 EMERGENCY DEPT VISIT HI MDM: CPT | Mod: 25

## 2023-08-07 PROCEDURE — 80053 COMPREHEN METABOLIC PANEL: CPT | Performed by: STUDENT IN AN ORGANIZED HEALTH CARE EDUCATION/TRAINING PROGRAM

## 2023-08-07 PROCEDURE — 85025 COMPLETE CBC W/AUTO DIFF WBC: CPT | Performed by: STUDENT IN AN ORGANIZED HEALTH CARE EDUCATION/TRAINING PROGRAM

## 2023-08-07 PROCEDURE — 36415 COLL VENOUS BLD VENIPUNCTURE: CPT | Performed by: STUDENT IN AN ORGANIZED HEALTH CARE EDUCATION/TRAINING PROGRAM

## 2023-08-07 PROCEDURE — 82248 BILIRUBIN DIRECT: CPT | Performed by: EMERGENCY MEDICINE

## 2023-08-07 PROCEDURE — 83690 ASSAY OF LIPASE: CPT | Performed by: EMERGENCY MEDICINE

## 2023-08-08 ENCOUNTER — HOSPITAL ENCOUNTER (EMERGENCY)
Facility: CLINIC | Age: 58
Discharge: HOME | End: 2023-08-08
Attending: EMERGENCY MEDICINE | Admitting: EMERGENCY MEDICINE
Payer: COMMERCIAL

## 2023-08-08 ENCOUNTER — APPOINTMENT (OUTPATIENT)
Dept: CT IMAGING | Facility: CLINIC | Age: 58
End: 2023-08-08
Attending: EMERGENCY MEDICINE
Payer: COMMERCIAL

## 2023-08-08 ENCOUNTER — APPOINTMENT (OUTPATIENT)
Dept: ULTRASOUND IMAGING | Facility: CLINIC | Age: 58
End: 2023-08-08
Attending: EMERGENCY MEDICINE
Payer: COMMERCIAL

## 2023-08-08 ENCOUNTER — APPOINTMENT (OUTPATIENT)
Dept: GENERAL RADIOLOGY | Facility: CLINIC | Age: 58
End: 2023-08-08
Attending: EMERGENCY MEDICINE
Payer: COMMERCIAL

## 2023-08-08 DIAGNOSIS — K86.89 DILATED PANCREATIC DUCT: ICD-10-CM

## 2023-08-08 DIAGNOSIS — K85.90 ACUTE PANCREATITIS, UNSPECIFIED COMPLICATION STATUS, UNSPECIFIED PANCREATITIS TYPE: ICD-10-CM

## 2023-08-08 LAB
ALBUMIN SERPL BCG-MCNC: 4.3 G/DL (ref 3.5–5.2)
ALP SERPL-CCNC: 123 U/L (ref 40–129)
ALT SERPL W P-5'-P-CCNC: 15 U/L (ref 0–70)
AST SERPL W P-5'-P-CCNC: 14 U/L (ref 0–45)
ATRIAL RATE - MUSE: 77 BPM
BILIRUB DIRECT SERPL-MCNC: <0.2 MG/DL (ref 0–0.3)
BILIRUB SERPL-MCNC: <0.2 MG/DL
D DIMER PPP FEU-MCNC: <0.27 UG/ML FEU (ref 0–0.5)
DIASTOLIC BLOOD PRESSURE - MUSE: NORMAL MMHG
INTERPRETATION ECG - MUSE: NORMAL
LIPASE SERPL-CCNC: 723 U/L (ref 13–60)
P AXIS - MUSE: 14 DEGREES
PR INTERVAL - MUSE: 152 MS
PROT SERPL-MCNC: 6.5 G/DL (ref 6.4–8.3)
QRS DURATION - MUSE: 96 MS
QT - MUSE: 368 MS
QTC - MUSE: 416 MS
R AXIS - MUSE: -74 DEGREES
SYSTOLIC BLOOD PRESSURE - MUSE: NORMAL MMHG
T AXIS - MUSE: 59 DEGREES
VENTRICULAR RATE- MUSE: 77 BPM

## 2023-08-08 PROCEDURE — 250N000009 HC RX 250: Performed by: EMERGENCY MEDICINE

## 2023-08-08 PROCEDURE — 250N000013 HC RX MED GY IP 250 OP 250 PS 637: Performed by: EMERGENCY MEDICINE

## 2023-08-08 PROCEDURE — 85379 FIBRIN DEGRADATION QUANT: CPT | Performed by: EMERGENCY MEDICINE

## 2023-08-08 PROCEDURE — 71046 X-RAY EXAM CHEST 2 VIEWS: CPT

## 2023-08-08 PROCEDURE — 250N000011 HC RX IP 250 OP 636: Performed by: EMERGENCY MEDICINE

## 2023-08-08 PROCEDURE — 36415 COLL VENOUS BLD VENIPUNCTURE: CPT | Performed by: EMERGENCY MEDICINE

## 2023-08-08 PROCEDURE — 76705 ECHO EXAM OF ABDOMEN: CPT

## 2023-08-08 PROCEDURE — 74177 CT ABD & PELVIS W/CONTRAST: CPT

## 2023-08-08 RX ORDER — ONDANSETRON 4 MG/1
4 TABLET, ORALLY DISINTEGRATING ORAL EVERY 8 HOURS PRN
Qty: 10 TABLET | Refills: 0 | Status: SHIPPED | OUTPATIENT
Start: 2023-08-08 | End: 2023-08-11

## 2023-08-08 RX ORDER — OXYCODONE HYDROCHLORIDE 5 MG/1
5 TABLET ORAL ONCE
Status: COMPLETED | OUTPATIENT
Start: 2023-08-08 | End: 2023-08-08

## 2023-08-08 RX ORDER — IOPAMIDOL 755 MG/ML
75 INJECTION, SOLUTION INTRAVASCULAR ONCE
Status: COMPLETED | OUTPATIENT
Start: 2023-08-08 | End: 2023-08-08

## 2023-08-08 RX ORDER — OXYCODONE HYDROCHLORIDE 5 MG/1
5 TABLET ORAL EVERY 6 HOURS PRN
Qty: 12 TABLET | Refills: 0 | Status: SHIPPED | OUTPATIENT
Start: 2023-08-08 | End: 2023-08-11

## 2023-08-08 RX ADMIN — IOPAMIDOL 75 ML: 755 INJECTION, SOLUTION INTRAVENOUS at 04:36

## 2023-08-08 RX ADMIN — OXYCODONE HYDROCHLORIDE 5 MG: 5 TABLET ORAL at 06:08

## 2023-08-08 RX ADMIN — SODIUM CHLORIDE 62 ML: 9 INJECTION, SOLUTION INTRAVENOUS at 04:36

## 2023-08-08 ASSESSMENT — ACTIVITIES OF DAILY LIVING (ADL)
ADLS_ACUITY_SCORE: 33
ADLS_ACUITY_SCORE: 35

## 2023-08-08 NOTE — DISCHARGE INSTRUCTIONS
Please follow up with gastroenterology and primary care. Return to the emergency department with any worrisome symptoms.

## 2023-08-08 NOTE — ED PROVIDER NOTES
"  History     Chief Complaint:  Abdominal Pain, Back Pain, Chest Pain, and Flank Pain       The history is provided by the patient. A  was used (Gabonese).      Lior Thompson is a 57 year old male who presents with abdominal pain, back pain, and chest pain. Patient states that he has had constant pain for 2 weeks, and came to the ED tonight because the pain worsened. Patient notes that the pain centered around his lower left ribs, but expresses that the pain radiates to his back. He states that his pain is exarbetaed when he is bending down. Patient expresses upper abdominal pain upon palpation, as well as pain in his calf that onset 2 weeks ago. He denies any cough, fever,  symptoms, injury to his feet, or long distance travel.      Independent None - Patient OnlyHistorian:   None - Patient Only      Medications:    Exenatide   Omeprazole   Trazodone   Metformin   Aspirin   Atorvastatin   Gabapentin   Glimepiride   Hydrocodone   NOVOLOG MIX   Lisinopril   Ondansetron   Oxycodone   Sertraline   Tramadol   Valacyclovir     Past Medical History:    Hypertension  Herpes zoster  Hyperlipidemia  Chronic pain disorder  Depressive disorder  Microalbuminuria  Type 2 diabetes  Myopia  Neuropathy  Diabetes    Physical Exam   Patient Vitals for the past 24 hrs:   BP Temp Temp src Pulse Resp SpO2 Height Weight   08/07/23 2307 (!) 159/88 98  F (36.7  C) Oral 77 16 99 % 1.6 m (5' 3\") 68 kg (150 lb)        Physical Exam  Eyes:  The pupils are equal and round    Conjunctivae and sclerae are normal  ENT:    The nose is normal    Pinnae are normal  CV:  Regular rate and rhythm     No edema  Resp:  Lungs are clear    Non-labored    No rales    No wheezing   GI:  Abdomen is soft with tenderness of the right upper quadrant, there is no rigidity    No distension    No rebound tenderness   MS:  Normal muscular tone    No asymmetric leg swelling    Tenderness of the right lower chest wall  Skin:  No rash or " acute skin lesions noted  Neuro:   Awake, alert.      Speech is normal and fluent.    Face is symmetric.     Moves all extremities      Emergency Department Course   ECG  ECG taken at 2303, ECG read at 2307  Normal sinus rhythm  Left axis deviation  Abnormal ECG   No change as compared to prior, dated 12/9/2019.  Rate 77 bpm. ND interval 152 ms. QRS duration 96 ms. QT/QTc 368/416 ms. P-R-T axes 14 -74 59.     Imaging:  CT Abdomen Pelvis w Contrast   Final Result   IMPRESSION:    1. Mild diffuse prominence of the main pancreatic duct. No definite solid pancreatic mass. No significant peripancreatic fat stranding.   2. Moderate amount of stool throughout the colon, nonspecific, can be seen with constipation.                         Chest XR,  PA & LAT   Final Result   IMPRESSION: Heart size within normal limits. Lungs are clear. No pneumothorax or pleural effusion.      Abdomen US, limited (RUQ only)   Final Result   IMPRESSION:   1.  Normal limited abdominal ultrasound.               Report per radiology    Laboratory:  Labs Ordered and Resulted from Time of ED Arrival to Time of ED Departure   BASIC METABOLIC PANEL - Abnormal       Result Value    Sodium 138      Potassium 4.0      Chloride 100      Carbon Dioxide (CO2) 25      Anion Gap 13      Urea Nitrogen 26.4 (*)     Creatinine 0.95      Calcium 9.4      Glucose 281 (*)     GFR Estimate >90     LIPASE - Abnormal    Lipase 723 (*)    TROPONIN T, HIGH SENSITIVITY - Normal    Troponin T, High Sensitivity 11     HEPATIC FUNCTION PANEL - Normal    Protein Total 6.5      Albumin 4.3      Bilirubin Total <0.2      Alkaline Phosphatase 123      AST 14      ALT 15      Bilirubin Direct <0.20     D DIMER QUANTITATIVE - Normal    D-Dimer Quantitative <0.27     CBC WITH PLATELETS AND DIFFERENTIAL    WBC Count 9.9      RBC Count 5.12      Hemoglobin 15.3      Hematocrit 43.5      MCV 85      MCH 29.9      MCHC 35.2      RDW 12.8      Platelet Count 228      % Neutrophils 48       % Lymphocytes 39      % Monocytes 9      % Eosinophils 3      % Basophils 1      % Immature Granulocytes 0      NRBCs per 100 WBC 0      Absolute Neutrophils 4.7      Absolute Lymphocytes 3.9      Absolute Monocytes 0.9      Absolute Eosinophils 0.3      Absolute Basophils 0.1      Absolute Immature Granulocytes 0.0      Absolute NRBCs 0.0     ROUTINE UA WITH MICROSCOPIC REFLEX TO CULTURE        Procedures   None    Emergency Department Course & Assessments:       Interventions:  Medications   iopamidol (ISOVUE-370) solution 75 mL (75 mLs Intravenous $Given 8/8/23 0436)   Saline Flush (62 mLs Intravenous $Given 8/8/23 0436)   oxyCODONE (ROXICODONE) tablet 5 mg (5 mg Oral $Given 8/8/23 0608)        Assessments:  0259    Independent Interpretation (X-rays, CTs, rhythm strip):  None    Consultations/Discussion of Management or Tests:  None     Social Determinants of Health affecting care:   None    Disposition:  The patient was discharged to home.     Impression & Plan    Medical Decision Making:  Lior Thompson is a 57 year old male who presents the emergency department with abdominal pain and chest pain.  Pain is on his right upper quadrant, right lower chest and right flank area.  He has pain with breathing.  D-dimer was negative.  EKG did not show any acute ischemic changes.  Troponin is normal and given duration of pain of 2 weeks do not think further workup for ACS is necessary.  Chest x-ray clear.  Oxygenation is normal.  No swelling on his legs.  Given normal D-dimer do not think further workup for pulmonary embolism is necessary.  Laboratory workup did show an elevated lipase which is likely the source of his symptoms.  Ultrasound was normal.  CT scan of his abdomen pelvis showed some possibly a mild dilation of his pancreatic duct.  Discussed admission with patient for continued pain control and diagnostics.  He politely declined and requested to be discharged home.  He was given a dose of pain  medication here as well as a prescription for pain medication to use at home.  Importance of follow-up with gastroenterology was reviewed with him and he verbalized understanding.  Discussed warning signs return precautions.  Follow-up with PCP as well.    Entirety of the visit was performed with a telephone .    Diagnosis:    ICD-10-CM    1. Acute pancreatitis, unspecified complication status, unspecified pancreatitis type  K85.90       2. Dilated pancreatic duct  K86.89            Discharge Medications:  Discharge Medication List as of 8/8/2023  6:01 AM        START taking these medications    Details   ondansetron (ZOFRAN ODT) 4 MG ODT tab Take 1 tablet (4 mg) by mouth every 8 hours as needed for nausea, Disp-10 tablet, R-0, Local Print      oxyCODONE (ROXICODONE) 5 MG tablet Take 1 tablet (5 mg) by mouth every 6 hours as needed for pain, Disp-12 tablet, R-0, Local Print              Scribe Disclosure:  IClay, am serving as a scribe at 6:53 AM on 8/8/2023 to document services personally performed by Gildardo Anaya MD  based on my observations and the provider's statements to me.     8/8/2023   Gildardo Anaya MD [place], Aaron Joseph, MD  08/08/23 1332

## 2023-08-08 NOTE — ED TRIAGE NOTES
Pt has upper abd, flank and back pain  Pt has had this for 3 weeks   Pt states the pain has increased and is too much to handle

## 2023-08-09 ENCOUNTER — HOSPITAL ENCOUNTER (EMERGENCY)
Facility: CLINIC | Age: 58
Discharge: HOME | End: 2023-08-09
Attending: EMERGENCY MEDICINE | Admitting: EMERGENCY MEDICINE
Payer: COMMERCIAL

## 2023-08-09 VITALS
TEMPERATURE: 97.3 F | RESPIRATION RATE: 16 BRPM | OXYGEN SATURATION: 98 % | HEART RATE: 80 BPM | SYSTOLIC BLOOD PRESSURE: 135 MMHG | DIASTOLIC BLOOD PRESSURE: 70 MMHG

## 2023-08-09 DIAGNOSIS — K85.90 ACUTE PANCREATITIS WITHOUT INFECTION OR NECROSIS, UNSPECIFIED PANCREATITIS TYPE: ICD-10-CM

## 2023-08-09 LAB
ALBUMIN SERPL BCG-MCNC: 3.8 G/DL (ref 3.5–5.2)
ALP SERPL-CCNC: 84 U/L (ref 40–129)
ALT SERPL W P-5'-P-CCNC: 15 U/L (ref 0–70)
ANION GAP SERPL CALCULATED.3IONS-SCNC: 15 MMOL/L (ref 7–15)
AST SERPL W P-5'-P-CCNC: 14 U/L (ref 0–45)
BASOPHILS # BLD AUTO: 0.1 10E3/UL (ref 0–0.2)
BASOPHILS NFR BLD AUTO: 1 %
BILIRUB SERPL-MCNC: <0.2 MG/DL
BUN SERPL-MCNC: 19.4 MG/DL (ref 6–20)
CALCIUM SERPL-MCNC: 8.8 MG/DL (ref 8.6–10)
CHLORIDE SERPL-SCNC: 100 MMOL/L (ref 98–107)
CREAT SERPL-MCNC: 0.9 MG/DL (ref 0.67–1.17)
DEPRECATED HCO3 PLAS-SCNC: 21 MMOL/L (ref 22–29)
EOSINOPHIL # BLD AUTO: 0.4 10E3/UL (ref 0–0.7)
EOSINOPHIL NFR BLD AUTO: 3 %
ERYTHROCYTE [DISTWIDTH] IN BLOOD BY AUTOMATED COUNT: 13.2 % (ref 10–15)
GFR SERPL CREATININE-BSD FRML MDRD: >90 ML/MIN/1.73M2
GLUCOSE SERPL-MCNC: 328 MG/DL (ref 70–99)
HCT VFR BLD AUTO: 44.1 % (ref 40–53)
HGB BLD-MCNC: 15.3 G/DL (ref 13.3–17.7)
IMM GRANULOCYTES # BLD: 0 10E3/UL
IMM GRANULOCYTES NFR BLD: 0 %
LIPASE SERPL-CCNC: 104 U/L (ref 13–60)
LYMPHOCYTES # BLD AUTO: 4 10E3/UL (ref 0.8–5.3)
LYMPHOCYTES NFR BLD AUTO: 37 %
MCH RBC QN AUTO: 30 PG (ref 26.5–33)
MCHC RBC AUTO-ENTMCNC: 34.7 G/DL (ref 31.5–36.5)
MCV RBC AUTO: 87 FL (ref 78–100)
MONOCYTES # BLD AUTO: 0.7 10E3/UL (ref 0–1.3)
MONOCYTES NFR BLD AUTO: 7 %
NEUTROPHILS # BLD AUTO: 5.5 10E3/UL (ref 1.6–8.3)
NEUTROPHILS NFR BLD AUTO: 52 %
NRBC # BLD AUTO: 0 10E3/UL
NRBC BLD AUTO-RTO: 0 /100
PLATELET # BLD AUTO: 230 10E3/UL (ref 150–450)
POTASSIUM SERPL-SCNC: 3.9 MMOL/L (ref 3.4–5.3)
PROT SERPL-MCNC: 6 G/DL (ref 6.4–8.3)
RBC # BLD AUTO: 5.1 10E6/UL (ref 4.4–5.9)
SODIUM SERPL-SCNC: 136 MMOL/L (ref 136–145)
WBC # BLD AUTO: 10.6 10E3/UL (ref 4–11)

## 2023-08-09 PROCEDURE — 83690 ASSAY OF LIPASE: CPT | Performed by: EMERGENCY MEDICINE

## 2023-08-09 PROCEDURE — 250N000013 HC RX MED GY IP 250 OP 250 PS 637: Performed by: EMERGENCY MEDICINE

## 2023-08-09 PROCEDURE — 80053 COMPREHEN METABOLIC PANEL: CPT | Performed by: EMERGENCY MEDICINE

## 2023-08-09 PROCEDURE — 36415 COLL VENOUS BLD VENIPUNCTURE: CPT | Performed by: EMERGENCY MEDICINE

## 2023-08-09 PROCEDURE — 85025 COMPLETE CBC W/AUTO DIFF WBC: CPT | Performed by: EMERGENCY MEDICINE

## 2023-08-09 PROCEDURE — 99283 EMERGENCY DEPT VISIT LOW MDM: CPT

## 2023-08-09 RX ORDER — OXYCODONE HYDROCHLORIDE 5 MG/1
5 TABLET ORAL ONCE
Status: COMPLETED | OUTPATIENT
Start: 2023-08-09 | End: 2023-08-09

## 2023-08-09 RX ADMIN — OXYCODONE HYDROCHLORIDE 5 MG: 5 TABLET ORAL at 02:03

## 2023-08-09 ASSESSMENT — ACTIVITIES OF DAILY LIVING (ADL): ADLS_ACUITY_SCORE: 35

## 2023-08-09 NOTE — ED PROVIDER NOTES
History   Chief Complaint:  Abdominal Pain    A  was used.      Lior Thompson is a 57 year old male with history of type 2 diabetes, hypertension, hyperlipidemia, chronic pain disorder presenting to the emergency department for evaluation of abdominal pain. Lior was seen here at [facility] yesterday and discharged to home with Zofran and Oxycodone for acute pancreatitis. He states that he has taken two doses of his medications without any relief of pain. Lior presently reports 10/10 in severity abdominal pain and states that he would like to be admitted for pain control. He has been able to eat. He last ate a cucumber salad with orange. He reports occasional alcohol use and denies drug use.     Independent Historian:   None - Patient Only    Review of External Notes:   I reviewed CT imaging from yesterday, uncomplicated pancreatitis, labs also reviewed from yesterday.    Medications:    Aspirin   Atorvastatin   Gabapentin   Glimepiride   Norco   Novolog   Lisinopril   Metformin   Zofran   Oxycodone   Sertraline   Tramadol   Valacyclovir     Past Medical History:    Type 2 diabetes   Hyperlipidemia   Hypertension   Chronic pain disorder   Depressive disorder   Neuropathy      Physical Exam   Patient Vitals for the past 24 hrs:   BP Temp Temp src Pulse Resp SpO2   08/09/23 0327 135/70 -- -- 80 16 98 %   08/09/23 0142 139/71 97.3  F (36.3  C) Temporal 85 18 98 %     Physical Exam  Constitutional: Alert, attentive, GCS 15  HENT:    Nose: Nose normal.   Eyes: EOM are normal, anicteric, conjugate gaze  CV: regular rate and rhythm; no murmurs  Chest: Effort normal and breath sounds clear without wheezing or rales, symmetric bilaterally   GI:  non tender. No distension. No guarding or rebound.    Neurological: Alert, attentive, moving all extremities equally.   Skin: Skin is warm and dry.     Emergency Department Course     Laboratory:  Labs Ordered and Resulted from Time of ED Arrival to Time  of ED Departure   COMPREHENSIVE METABOLIC PANEL - Abnormal       Result Value    Sodium 136      Potassium 3.9      Chloride 100      Carbon Dioxide (CO2) 21 (*)     Anion Gap 15      Urea Nitrogen 19.4      Creatinine 0.90      Calcium 8.8      Glucose 328 (*)     Alkaline Phosphatase 84      AST 14      ALT 15      Protein Total 6.0 (*)     Albumin 3.8      Bilirubin Total <0.2      GFR Estimate >90     LIPASE - Abnormal    Lipase 104 (*)    CBC WITH PLATELETS AND DIFFERENTIAL    WBC Count 10.6      RBC Count 5.10      Hemoglobin 15.3      Hematocrit 44.1      MCV 87      MCH 30.0      MCHC 34.7      RDW 13.2      Platelet Count 230      % Neutrophils 52      % Lymphocytes 37      % Monocytes 7      % Eosinophils 3      % Basophils 1      % Immature Granulocytes 0      NRBCs per 100 WBC 0      Absolute Neutrophils 5.5      Absolute Lymphocytes 4.0      Absolute Monocytes 0.7      Absolute Eosinophils 0.4      Absolute Basophils 0.1      Absolute Immature Granulocytes 0.0      Absolute NRBCs 0.0       Emergency Department Course & Assessments:    Interventions:  Medications   oxyCODONE (ROXICODONE) tablet 5 mg (5 mg Oral $Given 23 0203)     Assessments:  0202 I obtained history and examined the patient as noted above.    0319 I rechecked the patient. I discussed findings and discharge with the patient. All questions answered.      Independent Interpretation (X-rays, CTs, rhythm strip):  None    Consultations/Discussion of Management or Tests:  None     Social Determinants of Health affecting care:   None    Disposition:  The patient was discharged to home.     Impression & Plan    Medical Decision Makin-year-old male past medical history seen for diabetes, hypertension, chronic pain presenting for evaluation of persistent upper abdominal pain in the setting of diagnosis of acute pancreatitis of unclear etiology the day prior.  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  On repeat labs, his pancreas Herb appears improving with his lipase of the low 100s down from 700, LFTs remain unremarkable and exam is benign.  I see no indication for repeat imaging, he was already tolerating p.o. and with a single dose of oxycodone, and reported his pain was significantly improved here.  I recommended continued alternating dose of Tylenol, ibuprofen, adequate hydration and oxycodone only for severe pain.  Recommended PCP follow-up.  Return precautions reviewed including increasing pain, fever or inability tolerate p.o.  I recommended eating bland diet    Diagnosis:    ICD-10-CM    1. Acute pancreatitis without infection or necrosis, unspecified pancreatitis type  K85.90         Reese Baeza MD  [employer]  4:19 AM 08/09/23     Scribe Disclosure:  I, Brea Carey, am serving as a scribe at 2:02 AM on 8/9/2023 to document services personally performed by Reese Baeza MD based on my observations and the provider's statements to me.     8/9/2023   Reese Baeza MD Dunbar, John Forrest, MD  08/09/23 0420

## 2023-08-09 NOTE — DISCHARGE INSTRUCTIONS
Your pancreatitis is improving at least based on your labs, you should continue taking 1000 mg of Tylenol every 6 hours, 600 mg of ibuprofen every 6 hours and can add in the oxycodone as needed.  I recommend clear liquid diet some with sugar in it and electrolytes, then slowly add back in very simple foods to help with your pain.  Should you develop fever, black or bloody stools, vomit up blood or are unable to tolerate eating.  You should return here.

## 2023-08-09 NOTE — ED TRIAGE NOTES
Pt seen in ED yesterday and diagnosed with pancreatitis. Returns today with paperwork from his employer regarding being unable to work when on opioids and reports he did not fill his prescription yet and is having increased pain.      Triage Assessment       Row Name 08/09/23 0142       Triage Assessment (Adult)    Airway WDL WDL       Respiratory WDL    Respiratory WDL WDL       Skin Circulation/Temperature WDL    Skin Circulation/Temperature WDL WDL       Cardiac WDL    Cardiac WDL WDL       Peripheral/Neurovascular WDL    Peripheral Neurovascular WDL WDL       Cognitive/Neuro/Behavioral WDL    Cognitive/Neuro/Behavioral WDL WDL